# Patient Record
(demographics unavailable — no encounter records)

---

## 2024-12-03 NOTE — DISCUSSION/SUMMARY
[FreeTextEntry1] : saline washes, keep hydrated, plenty of fluids, fever control, tylenol/motrin  PRN for headache and fever if worsening headache, vomiting, worsening fever, not tolerating any oral fluids to go to ER

## 2024-12-03 NOTE — HISTORY OF PRESENT ILLNESS
[de-identified] : runny nose [FreeTextEntry6] : patient with sore throat  low grade fever x 1 day and runny nose with nasal congestion